# Patient Record
Sex: MALE | Race: WHITE | Employment: FULL TIME | URBAN - METROPOLITAN AREA
[De-identification: names, ages, dates, MRNs, and addresses within clinical notes are randomized per-mention and may not be internally consistent; named-entity substitution may affect disease eponyms.]

---

## 2020-09-28 ENCOUNTER — OFFICE VISIT (OUTPATIENT)
Dept: FAMILY MEDICINE CLINIC | Facility: CLINIC | Age: 25
End: 2020-09-28
Payer: COMMERCIAL

## 2020-09-28 VITALS
DIASTOLIC BLOOD PRESSURE: 70 MMHG | WEIGHT: 151 LBS | HEIGHT: 67.5 IN | HEART RATE: 77 BPM | BODY MASS INDEX: 23.42 KG/M2 | RESPIRATION RATE: 18 BRPM | SYSTOLIC BLOOD PRESSURE: 124 MMHG

## 2020-09-28 DIAGNOSIS — Z00.00 ROUTINE PHYSICAL EXAMINATION: Primary | ICD-10-CM

## 2020-09-28 DIAGNOSIS — Z81.8 FAMILY HISTORY OF ANXIETY DISORDER: ICD-10-CM

## 2020-09-28 DIAGNOSIS — M99.01 CERVICOTHORACIC SOMATIC DYSFUNCTION: ICD-10-CM

## 2020-09-28 DIAGNOSIS — M99.03 SOMATIC DYSFUNCTION OF LUMBOSACRAL REGION: ICD-10-CM

## 2020-09-28 PROCEDURE — 98926 OSTEOPATH MANJ 3-4 REGIONS: CPT | Performed by: FAMILY MEDICINE

## 2020-09-28 PROCEDURE — 3008F BODY MASS INDEX DOCD: CPT | Performed by: FAMILY MEDICINE

## 2020-09-28 PROCEDURE — 3078F DIAST BP <80 MM HG: CPT | Performed by: FAMILY MEDICINE

## 2020-09-28 PROCEDURE — 99385 PREV VISIT NEW AGE 18-39: CPT | Performed by: FAMILY MEDICINE

## 2020-09-28 PROCEDURE — 3074F SYST BP LT 130 MM HG: CPT | Performed by: FAMILY MEDICINE

## 2020-09-28 PROCEDURE — 99203 OFFICE O/P NEW LOW 30 MIN: CPT | Performed by: FAMILY MEDICINE

## 2020-09-28 NOTE — PATIENT INSTRUCTIONS
All adult screening ordered and done appropriate for patient's age and gender and risk factors and complaints. Monitor blood pressures and record at home. Limit salt intake. Encouraged physical fitness and daily physical activity daily.   Possibly an anxi your stomach muscles. Hold for 5 seconds, then relax. Repeat 10 times. 4. Straight leg raise. Bend one leg at the knee and keep the other leg straight. Tighten your stomach muscles.  Slowly lift your straight leg 6 to 12 inches off the floor and hold for u floor. Hold for 10 seconds while breathing smoothly. 4. Abdominal crunch. Perform a pelvic tilt (above) flattening your lower back against the floor.  Holding the tension in your abdominal muscles, take another breath and raise your shoulder blades off the head or let your supporting shoulder sag. · Hold for 5 seconds. · Return to starting position. · Repeat 5 times. · Switch arms. Medardo last reviewed this educational content on 3/1/2018  © 4239-2370 [employer].  [address]07 [facility], instructions. Back Exercises: Leg Reach      Do this exercise on your hands and knees. Keep your knees under your hips and your hands under your shoulders. Keep your spine in a neutral position (not arched or sagging).  Be sure to maintain your neck’ Repeat 2 times. For your safety, check with your healthcare provider before starting an exercise program.    Medardo last reviewed this educational content on 3/1/2018  © 1906-0135 [employer]. 1407 [facility], [address] [place] [address].  All healthcare professional's instructions. Lower Body Exercises: Quad Stretch    This exercise stretches  your lower body to help your back. As you work out, don’t rush or strain. Stand arm’s length from a wall. Place one hand on it.   Here are the othe your spine in a neutral position (not arched or sagging). Keep your ears in line with your shoulders. Hold for a few seconds before starting the exercise:  1. Tighten your belly muscles and raise one arm straight in front of you, palm down.  Hold for 5 seco

## 2020-09-28 NOTE — PROGRESS NOTES
HPI:    Patient ID: Sara Mei is a 26year old male. Patient is a 71-year-old  male here to reestablish his care at this clinic. He has no known chronic medical illnesses. He is not on any medication on a daily basis.   He has a good appe tenderness. Musculoskeletal:      Cervical back: He exhibits decreased range of motion, tenderness, pain and spasm. Thoracic back: He exhibits decreased range of motion, tenderness, pain and spasm.       Lumbar back: He exhibits decreased range of aaron back and abdominal muscles work together to support your spine. The exercises below will help strengthen the lower back. It's important that you start exercising slowly and increase levels gradually. Always start any exercise program with stretching.  If wall. Move your feet about 12 inches away from the wall. Tighten your stomach muscles, and slowly bend your knees until they are at about a 45 degree angle. Don't go down too far. Hold about 5 seconds. Then slowly return to your starting position.  Repeat 1 Hold for 2 seconds, then slowly lower. Medardo last reviewed this educational content on 8/1/2019  © 8298-2332 [employer]. 1407 [facility], [address] [place] [address]. All rights reserved.  This information is not intended as a substitute for medical care. Always follow your healthcare professional's instructions. Back Exercises: Back Release  Do this exercise on your hands and knees. Keep your knees under your hips and your hands under your shoulders.       · Relax your abdominal and but for 5 seconds. Return to starting position. · Repeat 5 times. · Switch legs.   Medardo last reviewed this educational content on 3/1/2018  © 3192-0581 [employer]. 1407 [facility], [address] [place] [address]. All rights reserved.  This inform healthcare professional's instructions. Back Exercises: Lower Back Stretch    To start, sit in a chair with your feet flat on the floor. Shift your weight slightly forward.  Relax, and keep your ears, shoulders, and hips aligned while you do the foll buttocks. Don’t arch your back. · Hold for 30 to 60 seconds. Repeat 2 times. Switch legs.   For your safety, check with your healthcare provider before starting an exercise program.   Medardo last reviewed this educational content on 11/1/2017  © 2000-202 3. Do the exercise again, this time lifting your arm backward, palm up. Repeat 5 times. Switch sides and do each exercise with the other arm. Medardo last reviewed this educational content on 11/1/2017  © 3395-6984 [employer].  [address]

## 2020-09-29 LAB
ABSOLUTE BASOPHILS: 48 CELLS/UL (ref 0–200)
ABSOLUTE EOSINOPHILS: 111 CELLS/UL (ref 15–500)
ABSOLUTE LYMPHOCYTES: 1161 CELLS/UL (ref 850–3900)
ABSOLUTE MONOCYTES: 382 CELLS/UL (ref 200–950)
ABSOLUTE NEUTROPHILS: 3599 CELLS/UL (ref 1500–7800)
ALBUMIN/GLOBULIN RATIO: 2.1 (CALC) (ref 1–2.5)
ALBUMIN: 4.8 G/DL (ref 3.6–5.1)
ALKALINE PHOSPHATASE: 48 U/L (ref 36–130)
ALT: 17 U/L (ref 9–46)
APPEARANCE: CLEAR
AST: 17 U/L (ref 10–40)
BASOPHILS: 0.9 %
BILIRUBIN, TOTAL: 1.2 MG/DL (ref 0.2–1.2)
BILIRUBIN: NEGATIVE
BUN: 13 MG/DL (ref 7–25)
CALCIUM: 9.3 MG/DL (ref 8.6–10.3)
CARBON DIOXIDE: 27 MMOL/L (ref 20–32)
CHLORIDE: 105 MMOL/L (ref 98–110)
CHOL/HDLC RATIO: 3 (CALC)
CHOLESTEROL, TOTAL: 133 MG/DL
COLOR: YELLOW
CREATININE: 0.92 MG/DL (ref 0.6–1.35)
EGFR IF AFRICN AM: 134 ML/MIN/1.73M2
EGFR IF NONAFRICN AM: 116 ML/MIN/1.73M2
EOSINOPHILS: 2.1 %
GLOBULIN: 2.3 G/DL (CALC) (ref 1.9–3.7)
GLUCOSE: 91 MG/DL (ref 65–99)
GLUCOSE: NEGATIVE
HDL CHOLESTEROL: 44 MG/DL
HEMATOCRIT: 45.2 % (ref 38.5–50)
HEMOGLOBIN: 15.7 G/DL (ref 13.2–17.1)
KETONES: NEGATIVE
LDL-CHOLESTEROL: 73 MG/DL (CALC)
LEUKOCYTE ESTERASE: NEGATIVE
LYMPHOCYTES: 21.9 %
MCH: 29.6 PG (ref 27–33)
MCHC: 34.7 G/DL (ref 32–36)
MCV: 85.1 FL (ref 80–100)
MONOCYTES: 7.2 %
MPV: 9.8 FL (ref 7.5–12.5)
NEUTROPHILS: 67.9 %
NITRITE: NEGATIVE
NON-HDL CHOLESTEROL: 89 MG/DL (CALC)
OCCULT BLOOD: NEGATIVE
PH: 6.5 (ref 5–8)
PLATELET COUNT: 253 THOUSAND/UL (ref 140–400)
POTASSIUM: 4.2 MMOL/L (ref 3.5–5.3)
PROTEIN, TOTAL: 7.1 G/DL (ref 6.1–8.1)
PROTEIN: NEGATIVE
RDW: 13.1 % (ref 11–15)
RED BLOOD CELL COUNT: 5.31 MILLION/UL (ref 4.2–5.8)
SODIUM: 140 MMOL/L (ref 135–146)
SPECIFIC GRAVITY: 1.01 (ref 1–1.03)
TRIGLYCERIDES: 80 MG/DL
TSH W/REFLEX TO FT4: 1.64 MIU/L (ref 0.4–4.5)
WHITE BLOOD CELL COUNT: 5.3 THOUSAND/UL (ref 3.8–10.8)

## 2021-04-10 ENCOUNTER — PATIENT MESSAGE (OUTPATIENT)
Dept: FAMILY MEDICINE CLINIC | Facility: CLINIC | Age: 26
End: 2021-04-10

## 2021-04-11 NOTE — TELEPHONE ENCOUNTER
From: Girma Umanzor  To: Amy Lujan DO  Sent: 4/10/2021 5:46 PM CDT  Subject: Non-Urgent Medical Question    Hi Dr. Toro Single,    Lately I've been having a lot of trouble with my body. I believe I could have fibromyalgia and allodynia.  I have tro

## 2021-04-26 ENCOUNTER — OFFICE VISIT (OUTPATIENT)
Dept: FAMILY MEDICINE CLINIC | Facility: CLINIC | Age: 26
End: 2021-04-26
Payer: COMMERCIAL

## 2021-04-26 ENCOUNTER — LAB ENCOUNTER (OUTPATIENT)
Dept: LAB | Facility: HOSPITAL | Age: 26
End: 2021-04-26
Attending: FAMILY MEDICINE
Payer: COMMERCIAL

## 2021-04-26 VITALS — HEIGHT: 67.5 IN | BODY MASS INDEX: 23.27 KG/M2 | WEIGHT: 150 LBS | RESPIRATION RATE: 17 BRPM | TEMPERATURE: 98 F

## 2021-04-26 DIAGNOSIS — G89.29 CHRONIC PAIN OF MULTIPLE JOINTS: ICD-10-CM

## 2021-04-26 DIAGNOSIS — M25.50 CHRONIC PAIN OF MULTIPLE JOINTS: Primary | ICD-10-CM

## 2021-04-26 DIAGNOSIS — G89.29 CHRONIC PAIN OF MULTIPLE JOINTS: Primary | ICD-10-CM

## 2021-04-26 DIAGNOSIS — M79.10 MYALGIA: ICD-10-CM

## 2021-04-26 DIAGNOSIS — M25.50 CHRONIC PAIN OF MULTIPLE JOINTS: ICD-10-CM

## 2021-04-26 PROCEDURE — 85025 COMPLETE CBC W/AUTO DIFF WBC: CPT

## 2021-04-26 PROCEDURE — 80053 COMPREHEN METABOLIC PANEL: CPT

## 2021-04-26 PROCEDURE — 85652 RBC SED RATE AUTOMATED: CPT

## 2021-04-26 PROCEDURE — 36415 COLL VENOUS BLD VENIPUNCTURE: CPT

## 2021-04-26 PROCEDURE — 86038 ANTINUCLEAR ANTIBODIES: CPT

## 2021-04-26 PROCEDURE — 86431 RHEUMATOID FACTOR QUANT: CPT

## 2021-04-26 PROCEDURE — 99214 OFFICE O/P EST MOD 30 MIN: CPT | Performed by: FAMILY MEDICINE

## 2021-04-26 PROCEDURE — 3008F BODY MASS INDEX DOCD: CPT | Performed by: FAMILY MEDICINE

## 2021-04-26 NOTE — PROGRESS NOTES
HPI/Subjective:   Patient ID: Noa Brown is a 26year old male.     This patient is a 15-year-old  gentleman who presents with his fiancée regarding an unexplained pattern of unprovoked pain and sensory symptoms which involve various parts of h Comments: Quietly anxious. Assessment & Plan:   1. Chronic pain of multiple joints  Initiation of inflammatory disease rule out/work-up. - SED RATE, DAVID (AUTOMATED); Future  - TAO, DIRECT SCREEN; Future  - RHEUMATOID ARTHRITIS FACTOR;  Futur

## 2021-04-26 NOTE — PATIENT INSTRUCTIONS
We will initiate inflammatory disorder work-up. Patient will also be examined for trigger points in dermatographia to rule out fibromyalgia. Specialty referral to be determined based on the results of the initial work-up.

## 2021-05-02 ENCOUNTER — IMMUNIZATION (OUTPATIENT)
Dept: LAB | Age: 26
End: 2021-05-02
Attending: HOSPITALIST
Payer: COMMERCIAL

## 2021-05-02 DIAGNOSIS — Z23 NEED FOR VACCINATION: Primary | ICD-10-CM

## 2021-05-02 PROCEDURE — 0001A SARSCOV2 VAC 30MCG/0.3ML IM: CPT

## 2021-05-07 ENCOUNTER — LAB ENCOUNTER (OUTPATIENT)
Dept: LAB | Age: 26
End: 2021-05-07
Attending: INTERNAL MEDICINE
Payer: COMMERCIAL

## 2021-05-07 ENCOUNTER — OFFICE VISIT (OUTPATIENT)
Dept: RHEUMATOLOGY | Facility: CLINIC | Age: 26
End: 2021-05-07
Payer: COMMERCIAL

## 2021-05-07 VITALS
WEIGHT: 149 LBS | BODY MASS INDEX: 23.11 KG/M2 | HEIGHT: 67.5 IN | SYSTOLIC BLOOD PRESSURE: 139 MMHG | HEART RATE: 86 BPM | DIASTOLIC BLOOD PRESSURE: 83 MMHG

## 2021-05-07 DIAGNOSIS — G47.00 INSOMNIA, UNSPECIFIED TYPE: ICD-10-CM

## 2021-05-07 DIAGNOSIS — M79.18 MYOFASCIAL PAIN: ICD-10-CM

## 2021-05-07 DIAGNOSIS — R53.83 FATIGUE, UNSPECIFIED TYPE: ICD-10-CM

## 2021-05-07 DIAGNOSIS — R53.83 FATIGUE, UNSPECIFIED TYPE: Primary | ICD-10-CM

## 2021-05-07 PROCEDURE — 3079F DIAST BP 80-89 MM HG: CPT | Performed by: INTERNAL MEDICINE

## 2021-05-07 PROCEDURE — 36415 COLL VENOUS BLD VENIPUNCTURE: CPT

## 2021-05-07 PROCEDURE — 84443 ASSAY THYROID STIM HORMONE: CPT

## 2021-05-07 PROCEDURE — 99204 OFFICE O/P NEW MOD 45 MIN: CPT | Performed by: INTERNAL MEDICINE

## 2021-05-07 PROCEDURE — 3008F BODY MASS INDEX DOCD: CPT | Performed by: INTERNAL MEDICINE

## 2021-05-07 PROCEDURE — 3075F SYST BP GE 130 - 139MM HG: CPT | Performed by: INTERNAL MEDICINE

## 2021-05-07 RX ORDER — DULOXETIN HYDROCHLORIDE 20 MG/1
CAPSULE, DELAYED RELEASE ORAL
Qty: 30 CAPSULE | Refills: 2 | Status: SHIPPED | OUTPATIENT
Start: 2021-05-07 | End: 2021-06-29

## 2021-05-07 NOTE — PROGRESS NOTES
Dear Marge Bailey:    I saw your patient Frantz Abel in consultation this afternoon at your request for evaluation of diffuse musculoskeletal pain and fatigue. As you know, he is a 44-year-old gentleman who always was active in sports.   In June of 2020 in ret planned for October of this year. He quit smoking cigarettes but still uses e-cigarettes. Alcohol twice a week. 1 cup of coffee in the morning. He walks. Review of systems:  Occasional night sweats. No fevers or chills. His appetite is low.   He ha alessandra. Thank you for inviting me to participate in his care. Sincerely,      Shu Ames MD   Rheumatology.

## 2021-05-10 ENCOUNTER — OFFICE VISIT (OUTPATIENT)
Dept: FAMILY MEDICINE CLINIC | Facility: CLINIC | Age: 26
End: 2021-05-10
Payer: COMMERCIAL

## 2021-05-10 VITALS
DIASTOLIC BLOOD PRESSURE: 83 MMHG | TEMPERATURE: 98 F | BODY MASS INDEX: 23 KG/M2 | RESPIRATION RATE: 17 BRPM | HEART RATE: 81 BPM | HEIGHT: 67.5 IN | SYSTOLIC BLOOD PRESSURE: 130 MMHG

## 2021-05-10 DIAGNOSIS — G47.00 INSOMNIA, UNSPECIFIED TYPE: ICD-10-CM

## 2021-05-10 DIAGNOSIS — R53.83 FATIGUE, UNSPECIFIED TYPE: Primary | ICD-10-CM

## 2021-05-10 DIAGNOSIS — M79.10 MYALGIA: ICD-10-CM

## 2021-05-10 PROCEDURE — 3075F SYST BP GE 130 - 139MM HG: CPT | Performed by: FAMILY MEDICINE

## 2021-05-10 PROCEDURE — 3079F DIAST BP 80-89 MM HG: CPT | Performed by: FAMILY MEDICINE

## 2021-05-10 PROCEDURE — 99214 OFFICE O/P EST MOD 30 MIN: CPT | Performed by: FAMILY MEDICINE

## 2021-05-10 NOTE — PATIENT INSTRUCTIONS
Patient has been seen by rheumatology and the thinking is that this is more than likely fibromyalgia.   There may be other peripheral components but for now patient has been placed on duloxetine 20 mg and it has been explained to him that it takes at least

## 2021-05-10 NOTE — PROGRESS NOTES
HPI/Subjective:   Patient ID: Jia Dobson is a 26year old male.     This is a 59-year-old  gentleman who presented to the clinic with signs and symptoms consistent with fibromyalgia, however we were not presumptive and did at least baseline rul Referrals:  None   Patient Instructions   Patient has been seen by rheumatology and the thinking is that this is more than likely fibromyalgia.   There may be other peripheral components but for now patient has been placed on duloxetine 20 mg and it has bee

## 2021-05-23 ENCOUNTER — IMMUNIZATION (OUTPATIENT)
Dept: LAB | Age: 26
End: 2021-05-23
Attending: HOSPITALIST
Payer: COMMERCIAL

## 2021-05-23 DIAGNOSIS — Z23 NEED FOR VACCINATION: Primary | ICD-10-CM

## 2021-05-23 PROCEDURE — 0002A SARSCOV2 VAC 30MCG/0.3ML IM: CPT

## 2021-06-04 ENCOUNTER — PATIENT MESSAGE (OUTPATIENT)
Dept: RHEUMATOLOGY | Facility: CLINIC | Age: 26
End: 2021-06-04

## 2021-06-07 ENCOUNTER — PATIENT MESSAGE (OUTPATIENT)
Dept: RHEUMATOLOGY | Facility: CLINIC | Age: 26
End: 2021-06-07

## 2021-06-07 NOTE — TELEPHONE ENCOUNTER
From: Michelle Salomon  To: Hernesto Almazan MD  Sent: 6/7/2021 3:11 PM CDT  Subject: Other    Hi Dr. Carmelo Hinkle,    I tried calling to set up a virtual follow up with you as I believe my Duloxetine dose needs to be increased as I am still having pain throu

## 2021-06-07 NOTE — TELEPHONE ENCOUNTER
Dr. Lisa Hardy, patient was seen in office on 5/7/21. He is asking if he can schedule a virtual follow-up?

## 2021-06-07 NOTE — TELEPHONE ENCOUNTER
From: Kisha Meyer  To: Karlo Go MD  Sent: 6/4/2021 5:51 PM CDT  Subject: Other    Hi Dr.    I am so sorry for missing my follow up. I don't know how, but I got the time mixed up with my last appointment I had. When can I reschedule?     Thanks,

## 2021-06-08 NOTE — TELEPHONE ENCOUNTER
MyChart sent.   Future Appointments   Date Time Provider Nilda Mccurdy   6/29/2021 10:20 AM Jared Benedict MD [facility] [place] [place]

## 2021-06-29 ENCOUNTER — TELEMEDICINE (OUTPATIENT)
Dept: RHEUMATOLOGY | Facility: CLINIC | Age: 26
End: 2021-06-29

## 2021-06-29 DIAGNOSIS — M79.7 FIBROMYALGIA: ICD-10-CM

## 2021-06-29 DIAGNOSIS — F32.A DEPRESSION, UNSPECIFIED DEPRESSION TYPE: Primary | ICD-10-CM

## 2021-06-29 PROCEDURE — 99213 OFFICE O/P EST LOW 20 MIN: CPT | Performed by: INTERNAL MEDICINE

## 2021-06-29 RX ORDER — DULOXETIN HYDROCHLORIDE 30 MG/1
CAPSULE, DELAYED RELEASE ORAL
Qty: 30 CAPSULE | Refills: 2 | Status: SHIPPED | OUTPATIENT
Start: 2021-06-29 | End: 2021-10-08

## 2021-06-29 NOTE — PROGRESS NOTES
HPI/Subjective:   Patient ID: Hadley Taveras is a 23year old male. We did an Epic/Haik video/audio visit today, because of the coronavirus epidemic.       Mary Kate Mckenna is a 29-year-old gentleman who I met in consultation May 7th of 2021, with depression, in DR Particles 30 capsule 2     Sig: Take one daily.        Imaging & Referrals:  None

## 2021-09-16 ENCOUNTER — OFFICE VISIT (OUTPATIENT)
Dept: FAMILY MEDICINE CLINIC | Facility: CLINIC | Age: 26
End: 2021-09-16
Payer: COMMERCIAL

## 2021-09-16 VITALS
SYSTOLIC BLOOD PRESSURE: 124 MMHG | HEIGHT: 67.5 IN | HEART RATE: 80 BPM | DIASTOLIC BLOOD PRESSURE: 85 MMHG | BODY MASS INDEX: 23 KG/M2 | RESPIRATION RATE: 18 BRPM

## 2021-09-16 DIAGNOSIS — R07.89 COSTOCHONDRAL CHEST PAIN: ICD-10-CM

## 2021-09-16 DIAGNOSIS — M99.03 SOMATIC DYSFUNCTION OF LUMBOSACRAL REGION: ICD-10-CM

## 2021-09-16 DIAGNOSIS — M99.02 THORACIC REGION SOMATIC DYSFUNCTION: Primary | ICD-10-CM

## 2021-09-16 PROCEDURE — 99214 OFFICE O/P EST MOD 30 MIN: CPT | Performed by: FAMILY MEDICINE

## 2021-09-16 PROCEDURE — 3074F SYST BP LT 130 MM HG: CPT | Performed by: FAMILY MEDICINE

## 2021-09-16 PROCEDURE — 98926 OSTEOPATH MANJ 3-4 REGIONS: CPT | Performed by: FAMILY MEDICINE

## 2021-09-16 PROCEDURE — 3008F BODY MASS INDEX DOCD: CPT | Performed by: FAMILY MEDICINE

## 2021-09-16 PROCEDURE — 3079F DIAST BP 80-89 MM HG: CPT | Performed by: FAMILY MEDICINE

## 2021-09-16 NOTE — PATIENT INSTRUCTIONS
Quadruped Arm-Reach Exercise       Do this exercise on your hands and knees. Keep your knees under your hips and your hands under your shoulders. Keep your spine in a neutral position (not arched or sagging). Keep your ears in line with your shoulders.  H elbows toward your buttocks. Hold for up to 5 seconds, then return to starting position.   · Repeat 3 times, or as directed by your healthcare provider or physical therapist.  Shorty Rich last reviewed this educational content on 7/1/2020  © 1141-6665 The [employer] professional's instructions. Osteopathic manipulation performed in 3 regions including the thoracolumbar and lumbosacral regions with appreciated release.

## 2021-09-16 NOTE — PROGRESS NOTES
Subjective:   Patient ID: Patrice Franklin is a 23year old male.     The following is the initial intake for the patient who was seen in the ER on August 9, 2021:    HPI: This is a 67-year-old male with history of fibromyalgia who presents to the emergency d tenderness present. Decreased range of motion. Lumbar back: Spasms and tenderness present. Decreased range of motion. Back:       Comments: Regions of paraspinal spasm and increased muscle tone as depicted.    Neurological:      Mental Status: H not intended as a substitute for professional medical care. Always follow your healthcare professional's instructions. Shoulder, Upper Back, and Arm Exercise: Overhead Arm Stretch   To start, stand tall with your ears, shoulders, and hips in line.  Delmar Mckinnon program.  Adela Trujillo last reviewed this educational content on 7/1/2020  © 0105-3701 [employer]. All rights reserved. This information is not intended as a substitute for professional medical care.  Always follow your healthcare professional's

## 2021-10-08 RX ORDER — DULOXETIN HYDROCHLORIDE 30 MG/1
CAPSULE, DELAYED RELEASE ORAL
Qty: 30 CAPSULE | Refills: 2 | Status: SHIPPED | OUTPATIENT
Start: 2021-10-08 | End: 2022-01-08

## 2021-10-08 NOTE — TELEPHONE ENCOUNTER
Requested Prescriptions     Pending Prescriptions Disp Refills   • DULoxetine 30 MG Oral Cap DR Particles [Pharmacy Med Name: DULOXETINE HCL DR 30 MG CAP] 30 capsule 2     Sig: TAKE 1 CAPSULE BY MOUTH EVERY DAY     LF: 6/29/21 #30 CAP W/ 2 RF  LOV: 6/29/21 8.1   Albumin      3.4 - 5.0 g/dL 4.5   Globulin      2.8 - 4.4 g/dL 3.6   A/G Ratio      1.0 - 2.0 1.3   Patient Fasting? No   SED RATE      0 - 15 mm/Hr 6   TAO SCREEN      Negative Negative   RHEUMATOID FACTOR      <15 IU/mL <10       1.   Meredeth Better

## 2021-12-13 ENCOUNTER — TELEPHONE (OUTPATIENT)
Dept: RHEUMATOLOGY | Facility: CLINIC | Age: 26
End: 2021-12-13

## 2021-12-13 RX ORDER — PREGABALIN 50 MG/1
CAPSULE ORAL
Qty: 60 CAPSULE | Refills: 2 | Status: SHIPPED | OUTPATIENT
Start: 2021-12-13

## 2021-12-13 NOTE — TELEPHONE ENCOUNTER
Regarding: pain  ----- Message from Paula Faustin RN sent at 12/13/2021 10:40 AM CST -----       ----- Message sent from Dona Hebert RN to Umang Garcia at 12/13/2021 10:40 AM -----   Darien Perdomo,    Your message has been sent to Dr. Lawyer Corbett

## 2021-12-13 NOTE — TELEPHONE ENCOUNTER
I spoke to Latoya Alvarez. His mental state is good. The duloxetine is working for depression. He is aching all over however. We will try pregabalin 50 mg twice a day. He will schedule a follow-up appointment in January 2022.   He will continue his exerci

## 2022-01-08 RX ORDER — DULOXETIN HYDROCHLORIDE 30 MG/1
CAPSULE, DELAYED RELEASE ORAL
Qty: 30 CAPSULE | Refills: 2 | Status: SHIPPED | OUTPATIENT
Start: 2022-01-08

## 2022-01-08 NOTE — TELEPHONE ENCOUNTER
LOV: 6/29/21  Last Refilled:#30, 2rfs 10/8/21    No future appointments. Assessment & Plan:         1. Depression. Insomnia. Fatigue. Significant associated myofascial discomfort. Much improved. He will increase his duloxetine to 30 mg a day.  H

## 2022-03-30 ENCOUNTER — HOSPITAL ENCOUNTER (EMERGENCY)
Facility: HOSPITAL | Age: 27
Discharge: HOME | End: 2022-03-30
Attending: EMERGENCY MEDICINE
Payer: COMMERCIAL

## 2022-03-30 ENCOUNTER — APPOINTMENT (OUTPATIENT)
Dept: GENERAL RADIOLOGY | Facility: HOSPITAL | Age: 27
End: 2022-03-30
Attending: EMERGENCY MEDICINE
Payer: COMMERCIAL

## 2022-03-30 VITALS
OXYGEN SATURATION: 98 % | HEIGHT: 67 IN | DIASTOLIC BLOOD PRESSURE: 82 MMHG | BODY MASS INDEX: 29.82 KG/M2 | SYSTOLIC BLOOD PRESSURE: 125 MMHG | WEIGHT: 190 LBS | HEART RATE: 114 BPM | TEMPERATURE: 98 F | RESPIRATION RATE: 18 BRPM

## 2022-03-30 DIAGNOSIS — R07.89 CHEST WALL PAIN: Primary | ICD-10-CM

## 2022-03-30 PROCEDURE — 93005 ELECTROCARDIOGRAM TRACING: CPT

## 2022-03-30 PROCEDURE — 71101 X-RAY EXAM UNILAT RIBS/CHEST: CPT | Performed by: EMERGENCY MEDICINE

## 2022-03-30 PROCEDURE — 93010 ELECTROCARDIOGRAM REPORT: CPT | Performed by: EMERGENCY MEDICINE

## 2022-03-30 PROCEDURE — 99284 EMERGENCY DEPT VISIT MOD MDM: CPT

## 2022-03-30 RX ORDER — CYCLOBENZAPRINE HCL 10 MG
10 TABLET ORAL 3 TIMES DAILY PRN
Qty: 20 TABLET | Refills: 0 | Status: SHIPPED | OUTPATIENT
Start: 2022-03-30 | End: 2022-04-06

## 2022-03-30 RX ORDER — IBUPROFEN 600 MG/1
600 TABLET ORAL ONCE
Status: COMPLETED | OUTPATIENT
Start: 2022-03-30 | End: 2022-03-30

## 2022-03-30 RX ORDER — IBUPROFEN 600 MG/1
600 TABLET ORAL EVERY 8 HOURS PRN
Qty: 30 TABLET | Refills: 0 | Status: SHIPPED | OUTPATIENT
Start: 2022-03-30 | End: 2022-04-06

## 2022-03-31 NOTE — ED INITIAL ASSESSMENT (HPI)
Patient to ER from home with c/o right sided chest pain starting yesterday afternoon after \"a violent sneeze attack:\".  Patient states on the way here started to migrate to left side of chest.

## 2022-04-14 RX ORDER — DULOXETIN HYDROCHLORIDE 30 MG/1
30 CAPSULE, DELAYED RELEASE ORAL DAILY
Qty: 30 CAPSULE | Refills: 0 | Status: SHIPPED | OUTPATIENT
Start: 2022-04-14

## 2022-04-14 RX ORDER — PREGABALIN 50 MG/1
CAPSULE ORAL
Qty: 60 CAPSULE | Refills: 0 | Status: SHIPPED | OUTPATIENT
Start: 2022-04-14

## 2022-06-14 RX ORDER — PREGABALIN 50 MG/1
CAPSULE ORAL
Qty: 60 CAPSULE | Refills: 0 | Status: SHIPPED | OUTPATIENT
Start: 2022-06-14

## 2022-06-29 ENCOUNTER — TELEPHONE (OUTPATIENT)
Dept: FAMILY MEDICINE CLINIC | Facility: CLINIC | Age: 27
End: 2022-06-29

## 2022-08-01 ENCOUNTER — OFFICE VISIT (OUTPATIENT)
Dept: FAMILY MEDICINE CLINIC | Facility: CLINIC | Age: 27
End: 2022-08-01
Payer: COMMERCIAL

## 2022-08-01 VITALS
HEIGHT: 68 IN | WEIGHT: 188.81 LBS | BODY MASS INDEX: 28.61 KG/M2 | DIASTOLIC BLOOD PRESSURE: 64 MMHG | SYSTOLIC BLOOD PRESSURE: 114 MMHG | OXYGEN SATURATION: 96 % | TEMPERATURE: 98 F | HEART RATE: 107 BPM

## 2022-08-01 DIAGNOSIS — R07.89: Primary | ICD-10-CM

## 2022-08-01 DIAGNOSIS — M99.02 SOMATIC DYSFUNCTION OF THORACOLUMBAR REGION: ICD-10-CM

## 2022-08-01 PROCEDURE — 99214 OFFICE O/P EST MOD 30 MIN: CPT | Performed by: FAMILY MEDICINE

## 2022-08-01 PROCEDURE — 3008F BODY MASS INDEX DOCD: CPT | Performed by: FAMILY MEDICINE

## 2022-08-01 PROCEDURE — 3078F DIAST BP <80 MM HG: CPT | Performed by: FAMILY MEDICINE

## 2022-08-01 PROCEDURE — 3074F SYST BP LT 130 MM HG: CPT | Performed by: FAMILY MEDICINE

## 2022-08-01 PROCEDURE — 98925 OSTEOPATH MANJ 1-2 REGIONS: CPT | Performed by: FAMILY MEDICINE

## 2022-08-01 NOTE — PROCEDURES
Multiple vertebral segments in the cervical and thoracic region misaligned. Manual osteopathic manipulative therapy performed and immediate relief obtained. Patient instantaneously improved.

## 2022-09-16 RX ORDER — DULOXETIN HYDROCHLORIDE 30 MG/1
CAPSULE, DELAYED RELEASE ORAL
Qty: 30 CAPSULE | Refills: 2 | OUTPATIENT
Start: 2022-09-16

## 2022-09-16 NOTE — TELEPHONE ENCOUNTER
Overdue for an appointment. Provider gave last refill for 1 month only until patient schedules appointment.

## 2024-04-02 ENCOUNTER — V-VISIT (OUTPATIENT)
Dept: URGENT CARE | Age: 29
End: 2024-04-02

## 2024-04-02 VITALS
OXYGEN SATURATION: 96 % | TEMPERATURE: 98.6 F | HEIGHT: 68 IN | HEART RATE: 78 BPM | WEIGHT: 175 LBS | RESPIRATION RATE: 16 BRPM | DIASTOLIC BLOOD PRESSURE: 80 MMHG | SYSTOLIC BLOOD PRESSURE: 114 MMHG | BODY MASS INDEX: 26.52 KG/M2

## 2024-04-02 DIAGNOSIS — R09.89 RUNNY NOSE: ICD-10-CM

## 2024-04-02 DIAGNOSIS — J02.9 SORE THROAT: ICD-10-CM

## 2024-04-02 DIAGNOSIS — J02.9 ACUTE VIRAL PHARYNGITIS: Primary | ICD-10-CM

## 2024-04-02 DIAGNOSIS — J06.9 UPPER RESPIRATORY INFECTION, VIRAL: ICD-10-CM

## 2024-04-02 LAB
FLUAV AG UPPER RESP QL IA.RAPID: NEGATIVE
FLUBV AG UPPER RESP QL IA.RAPID: NEGATIVE
INTERNAL PROCEDURAL CONTROLS ACCEPTABLE: YES
S PYO AG THROAT QL IA.RAPID: NEGATIVE
SARS-COV+SARS-COV-2 AG RESP QL IA.RAPID: NOT DETECTED
TEST LOT EXPIRATION DATE: NORMAL
TEST LOT EXPIRATION DATE: NORMAL
TEST LOT NUMBER: NORMAL
TEST LOT NUMBER: NORMAL

## 2024-04-02 ASSESSMENT — PAIN SCALES - GENERAL: PAINLEVEL: 7

## 2025-05-20 ENCOUNTER — OFFICE VISIT (OUTPATIENT)
Dept: FAMILY MEDICINE CLINIC | Facility: CLINIC | Age: 30
End: 2025-05-20
Payer: COMMERCIAL

## 2025-05-20 VITALS
HEIGHT: 68 IN | BODY MASS INDEX: 33.19 KG/M2 | TEMPERATURE: 98 F | OXYGEN SATURATION: 94 % | WEIGHT: 219 LBS | HEART RATE: 80 BPM | DIASTOLIC BLOOD PRESSURE: 80 MMHG | SYSTOLIC BLOOD PRESSURE: 110 MMHG | RESPIRATION RATE: 18 BRPM

## 2025-05-20 DIAGNOSIS — Z28.21 TETANUS, DIPHTHERIA, AND ACELLULAR PERTUSSIS (TDAP) VACCINATION DECLINED: ICD-10-CM

## 2025-05-20 DIAGNOSIS — Z00.00 ROUTINE PHYSICAL EXAMINATION: Primary | ICD-10-CM

## 2025-05-20 DIAGNOSIS — Z67.90 BLOOD TYPE, RH POSITIVE: ICD-10-CM

## 2025-05-20 LAB
ALBUMIN SERPL-MCNC: 5.2 G/DL (ref 3.2–4.8)
ALBUMIN/GLOB SERPL: 2.4 {RATIO} (ref 1–2)
ALP LIVER SERPL-CCNC: 82 U/L (ref 45–117)
ALT SERPL-CCNC: 47 U/L (ref 10–49)
ANION GAP SERPL CALC-SCNC: 10 MMOL/L (ref 0–18)
AST SERPL-CCNC: 34 U/L (ref ?–34)
BASOPHILS # BLD AUTO: 0 X10(3) UL (ref 0–0.2)
BASOPHILS NFR BLD AUTO: 0 %
BILIRUB SERPL-MCNC: 0.7 MG/DL (ref 0.3–1.2)
BILIRUB UR QL: NEGATIVE
BUN BLD-MCNC: 18 MG/DL (ref 9–23)
BUN/CREAT SERPL: 16.2 (ref 10–20)
CALCIUM BLD-MCNC: 9.4 MG/DL (ref 8.7–10.4)
CHLORIDE SERPL-SCNC: 103 MMOL/L (ref 98–112)
CHOLEST SERPL-MCNC: 167 MG/DL (ref ?–200)
CLARITY UR: CLEAR
CO2 SERPL-SCNC: 25 MMOL/L (ref 21–32)
CREAT BLD-MCNC: 1.11 MG/DL (ref 0.7–1.3)
DEPRECATED RDW RBC AUTO: 39.3 FL (ref 35.1–46.3)
EGFRCR SERPLBLD CKD-EPI 2021: 93 ML/MIN/1.73M2 (ref 60–?)
EOSINOPHIL # BLD AUTO: 0.25 X10(3) UL (ref 0–0.7)
EOSINOPHIL NFR BLD AUTO: 2.7 %
ERYTHROCYTE [DISTWIDTH] IN BLOOD BY AUTOMATED COUNT: 12.2 % (ref 11–15)
FASTING PATIENT LIPID ANSWER: NO
FASTING STATUS PATIENT QL REPORTED: NO
GLOBULIN PLAS-MCNC: 2.2 G/DL (ref 2–3.5)
GLUCOSE BLD-MCNC: 87 MG/DL (ref 70–99)
GLUCOSE UR-MCNC: NORMAL MG/DL
HCT VFR BLD AUTO: 44.9 % (ref 39–53)
HDLC SERPL-MCNC: 29 MG/DL (ref 40–59)
HGB BLD-MCNC: 16 G/DL (ref 13–17.5)
HGB UR QL STRIP.AUTO: NEGATIVE
IMM GRANULOCYTES # BLD AUTO: 0.04 X10(3) UL (ref 0–1)
IMM GRANULOCYTES NFR BLD: 0.4 %
KETONES UR-MCNC: NEGATIVE MG/DL
LDLC SERPL DIRECT ASSAY-MCNC: 86 MG/DL (ref ?–100)
LEUKOCYTE ESTERASE UR QL STRIP.AUTO: NEGATIVE
LYMPHOCYTES # BLD AUTO: 2.03 X10(3) UL (ref 1–4)
LYMPHOCYTES NFR BLD AUTO: 21.6 %
MCH RBC QN AUTO: 31.1 PG (ref 26–34)
MCHC RBC AUTO-ENTMCNC: 35.6 G/DL (ref 31–37)
MCV RBC AUTO: 87.2 FL (ref 80–100)
MONOCYTES # BLD AUTO: 0.73 X10(3) UL (ref 0.1–1)
MONOCYTES NFR BLD AUTO: 7.8 %
NEUTROPHILS # BLD AUTO: 6.35 X10 (3) UL (ref 1.5–7.7)
NEUTROPHILS # BLD AUTO: 6.35 X10(3) UL (ref 1.5–7.7)
NEUTROPHILS NFR BLD AUTO: 67.5 %
NITRITE UR QL STRIP.AUTO: NEGATIVE
NONHDLC SERPL-MCNC: 138 MG/DL (ref ?–130)
OSMOLALITY SERPL CALC.SUM OF ELEC: 287 MOSM/KG (ref 275–295)
PH UR: 7 [PH] (ref 5–8)
PLATELET # BLD AUTO: 296 10(3)UL (ref 150–450)
POTASSIUM SERPL-SCNC: 4.1 MMOL/L (ref 3.5–5.1)
PROT SERPL-MCNC: 7.4 G/DL (ref 5.7–8.2)
PROT UR-MCNC: NEGATIVE MG/DL
RBC # BLD AUTO: 5.15 X10(6)UL (ref 4.3–5.7)
SODIUM SERPL-SCNC: 138 MMOL/L (ref 136–145)
SP GR UR STRIP: 1.02 (ref 1–1.03)
TRIGL SERPL-MCNC: 883 MG/DL (ref 30–149)
TSI SER-ACNC: 1.29 UIU/ML (ref 0.55–4.78)
UROBILINOGEN UR STRIP-ACNC: NORMAL
WBC # BLD AUTO: 9.4 X10(3) UL (ref 4–11)

## 2025-05-20 PROCEDURE — 99395 PREV VISIT EST AGE 18-39: CPT | Performed by: FAMILY MEDICINE

## 2025-05-20 RX ORDER — IBUPROFEN 600 MG/1
1 TABLET, FILM COATED ORAL EVERY 6 HOURS PRN
COMMUNITY
Start: 2022-09-10

## 2025-05-20 NOTE — PATIENT INSTRUCTIONS
All adult screening ordered and done appropriate for patient's age and gender and risk factors and complaints.  Monitor blood pressures and record at home. Limit salt intake.  Recommend weight loss via daily exercising and consistent healthy dietary changes.  Encouraged physical fitness and daily physical activity daily.

## 2025-05-20 NOTE — PROGRESS NOTES
Subjective:     Patient ID: Meng Keen is a 29 year old male.    29 year old  male here for complete preventive care physical and for status update on any confirmed chronic medical illnesses and follow up on any previous labs or procedures that were suggestive or in need of further work up. Bowel, bladder, and sexual functions are intact.    Patient reports that his spouse is currently pregnant with Rh- factor and patient needs his blood type determine in order to confirm the need for RhoGAM administration for his spouse.    Patient just received a Tdap booster May 19, 2025.      History/Other:   Review of Systems  Current Medications[1]  Allergies:Allergies[2]    Past Medical History[3]   Past Surgical History[4]   Family History[5]   Social History: Short Social Hx on File[6]     Objective:   Vitals:    05/20/25 1624   BP: 110/80   Pulse:    Resp:    Temp:        Physical Exam  Constitutional:       Appearance: Normal appearance. He is not ill-appearing.   HENT:      Head: Normocephalic and atraumatic.      Right Ear: Tympanic membrane normal.      Left Ear: Tympanic membrane normal.      Nose: Nose normal.      Mouth/Throat:      Mouth: Mucous membranes are moist.   Neck:      Thyroid: No thyromegaly.   Cardiovascular:      Rate and Rhythm: Normal rate and regular rhythm.      Heart sounds: Normal heart sounds.   Pulmonary:      Effort: Pulmonary effort is normal.      Breath sounds: Normal breath sounds.   Abdominal:      General: Bowel sounds are normal.      Palpations: Abdomen is soft. There is no mass.   Neurological:      General: No focal deficit present.      Mental Status: He is alert and oriented to person, place, and time.      Deep Tendon Reflexes: Reflexes normal.   Psychiatric:         Mood and Affect: Mood normal.         Assessment & Plan:   1. Routine physical examination  This patient has a well exam.  The following labs have been ordered.  - CBC W Differential W Platelet [E];  Future  - Comp Metabolic Panel (14) [E]; Future  - Lipid Panel [E]; Future  - TSH W Reflex To Free T4 [E]; Future  - Urinalysis, Routine [E]; Future  - CBC W Differential W Platelet [E]  - Comp Metabolic Panel (14) [E]  - Lipid Panel [E]  - TSH W Reflex To Free T4 [E]  - Urinalysis, Routine [E]  - Direct LDL    2. Blood type, Rh positive  Needs to be determined for his wife's current pregnancy.  - Patient Blood Type; Future    3. Tetanus, diphtheria, and acellular pertussis (Tdap) vaccination declined  Patient just had this administered secondary to wife's pregnancy.  - TETANUS, DIPHTHERIA TOXOIDS AND ACELLULAR PERTUSIS VACCINE (TDAP), >7 YEARS, IM USE      No orders of the defined types were placed in this encounter.      Meds This Visit:  Requested Prescriptions      No prescriptions requested or ordered in this encounter       Imaging & Referrals:  TETANUS, DIPHTHERIA TOXOIDS AND ACELLULAR PERTUSIS VACCINE (TDAP), >7 YEARS, IM USE     Patient Instructions   All adult screening ordered and done appropriate for patient's age and gender and risk factors and complaints.  Monitor blood pressures and record at home. Limit salt intake.  Recommend weight loss via daily exercising and consistent healthy dietary changes.  Encouraged physical fitness and daily physical activity daily.    Return in about 1 year (around 5/20/2026), or if symptoms worsen or fail to improve.         [1]   Current Outpatient Medications   Medication Sig Dispense Refill    ibuprofen 600 MG Oral Tab Take 1 tablet (600 mg total) by mouth every 6 (six) hours as needed.     [2] No Known Allergies  [3]   Past Medical History:   Fibromyalgia    Lightheaded    Shortness of breath   [4] No past surgical history on file.  [5]   Family History  Problem Relation Age of Onset    Hypertension Father    [6]   Social History  Socioeconomic History    Marital status:    Tobacco Use    Smoking status: Former     Types: Cigarettes    Smokeless tobacco:  Never   Vaping Use    Vaping status: Some Days    Substances: Nicotine, THC, CBD    Devices: Disposable   Substance and Sexual Activity    Alcohol use: Yes     Alcohol/week: 0.0 standard drinks of alcohol    Drug use: Yes     Types: Cannabis     Comment: Marijuana use    Sexual activity: Yes     Partners: Female

## 2025-05-21 ENCOUNTER — TELEPHONE (OUTPATIENT)
Dept: FAMILY MEDICINE CLINIC | Facility: CLINIC | Age: 30
End: 2025-05-21

## 2025-05-21 DIAGNOSIS — E78.5 ELEVATED LIPIDS: ICD-10-CM

## 2025-05-21 DIAGNOSIS — E78.2 ELEVATED TRIGLYCERIDES WITH HIGH CHOLESTEROL: ICD-10-CM

## 2025-05-21 DIAGNOSIS — Z67.90 BLOOD TYPE, RH POSITIVE: Primary | ICD-10-CM

## 2025-05-21 NOTE — TELEPHONE ENCOUNTER
Renee in [facility] lab calling, says that patient had type and screen drawn yesterday in lab and the person who jen the specimen did not initial it so voids the specimen. They are not able to process this.     Patient will need new order and redraw.     Order yesterday was just blood type, do we need to order type and screen?     Please place order for correct testing and when placed we will need to call patient to have him come in for redraw.

## 2025-05-22 NOTE — TELEPHONE ENCOUNTER
For blood bank, specimen needs a double verification in Epic.  And it needs the ID numbers of two employees on the vial.     New order pended.

## 2025-05-23 ENCOUNTER — LAB ENCOUNTER (OUTPATIENT)
Dept: LAB | Facility: HOSPITAL | Age: 30
End: 2025-05-23
Attending: FAMILY MEDICINE
Payer: COMMERCIAL

## 2025-05-23 DIAGNOSIS — E78.2 ELEVATED TRIGLYCERIDES WITH HIGH CHOLESTEROL: ICD-10-CM

## 2025-05-23 DIAGNOSIS — Z67.90 BLOOD TYPE, RH POSITIVE: ICD-10-CM

## 2025-05-23 DIAGNOSIS — E78.5 ELEVATED LIPIDS: ICD-10-CM

## 2025-05-23 LAB
CHOLEST SERPL-MCNC: 164 MG/DL (ref ?–200)
FASTING PATIENT LIPID ANSWER: YES
HDLC SERPL-MCNC: 30 MG/DL (ref 40–59)
LDLC SERPL CALC-MCNC: 100 MG/DL (ref ?–100)
NONHDLC SERPL-MCNC: 134 MG/DL (ref ?–130)
RH BLOOD TYPE: POSITIVE
TRIGL SERPL-MCNC: 196 MG/DL (ref 30–149)
VLDLC SERPL CALC-MCNC: 33 MG/DL (ref 0–30)

## 2025-05-23 PROCEDURE — 86901 BLOOD TYPING SEROLOGIC RH(D): CPT

## 2025-05-23 PROCEDURE — 80061 LIPID PANEL: CPT

## 2025-05-23 PROCEDURE — 86900 BLOOD TYPING SEROLOGIC ABO: CPT

## 2025-05-23 PROCEDURE — 36415 COLL VENOUS BLD VENIPUNCTURE: CPT

## 2025-05-23 NOTE — TELEPHONE ENCOUNTER
Patient called states he states he saw his lipids and was not aware he was having them drawn and had eaten fried chicken prior. He would like another order for the lipid panel to have redrawn for more accurate results. He will be getting his labs drawn today. I made her aware I will convey the above to Dr. Tidwell. Patient verbalized understanding. No further questions or concerns at this time.    Dr. Tidwell - lab for lipids pended, sign if appropriate [otherwise cancel pended lab and advise]

## 2025-05-23 NOTE — TELEPHONE ENCOUNTER
Patient called again to see if doctor can order lipid. He is on his way to lab.    He is concerned that his triglycerides are high; not fasting. He would like to repeat today fasting.